# Patient Record
Sex: MALE | Race: WHITE | NOT HISPANIC OR LATINO | URBAN - METROPOLITAN AREA
[De-identification: names, ages, dates, MRNs, and addresses within clinical notes are randomized per-mention and may not be internally consistent; named-entity substitution may affect disease eponyms.]

---

## 2022-04-19 ENCOUNTER — HOSPITAL ENCOUNTER (EMERGENCY)
Facility: HOSPITAL | Age: 67
Discharge: HOME | End: 2022-04-19
Attending: EMERGENCY MEDICINE
Payer: MEDICARE

## 2022-04-19 VITALS
WEIGHT: 200 LBS | DIASTOLIC BLOOD PRESSURE: 82 MMHG | HEIGHT: 70 IN | BODY MASS INDEX: 28.63 KG/M2 | SYSTOLIC BLOOD PRESSURE: 159 MMHG | TEMPERATURE: 98 F | RESPIRATION RATE: 19 BRPM | HEART RATE: 65 BPM | OXYGEN SATURATION: 99 %

## 2022-04-19 DIAGNOSIS — R07.9 CHEST PAIN IN ADULT: ICD-10-CM

## 2022-04-19 DIAGNOSIS — J45.909 ASTHMA, UNSPECIFIED ASTHMA SEVERITY, UNSPECIFIED WHETHER COMPLICATED, UNSPECIFIED WHETHER PERSISTENT: Primary | ICD-10-CM

## 2022-04-19 PROCEDURE — 99284 EMERGENCY DEPT VISIT MOD MDM: CPT

## 2022-04-19 PROCEDURE — 93010 ELECTROCARDIOGRAM REPORT: CPT | Mod: ,,, | Performed by: INTERNAL MEDICINE

## 2022-04-19 PROCEDURE — 93010 EKG 12-LEAD: ICD-10-PCS | Mod: ,,, | Performed by: INTERNAL MEDICINE

## 2022-04-19 PROCEDURE — 93005 ELECTROCARDIOGRAM TRACING: CPT

## 2022-04-19 RX ORDER — ALBUTEROL SULFATE 2.5 MG/.5ML
2.5 SOLUTION RESPIRATORY (INHALATION) EVERY 4 HOURS PRN
Qty: 20 EACH | Refills: 0 | Status: SHIPPED | OUTPATIENT
Start: 2022-04-19 | End: 2023-04-19

## 2022-04-19 RX ORDER — ALBUTEROL SULFATE 1.25 MG/3ML
1.25 SOLUTION RESPIRATORY (INHALATION) EVERY 6 HOURS PRN
COMMUNITY

## 2022-04-19 RX ORDER — ALBUTEROL SULFATE 90 UG/1
1-2 AEROSOL, METERED RESPIRATORY (INHALATION) EVERY 6 HOURS PRN
Qty: 8 G | Refills: 0 | Status: SHIPPED | OUTPATIENT
Start: 2022-04-19

## 2022-04-19 NOTE — ED PROVIDER NOTES
"Encounter Date: 4/19/2022    SCRIBE #1 NOTE: I, Donnakenya Martinez-Hoa and am scribing for, and in the presence of, Aida Wills MD.       History     Chief Complaint   Patient presents with    Shortness of Breath     SOB approx 30 min was incarcerated and was unable to get to albuterol inhaler today, began w SOB, sats are 99% on albuterol mask. Denies pain.     66 year old male with a PMHx of asthma presents to the ED via EMS with wheezing and shortness of breath that began after a police altercation prior to arrival. The patient reports he got pulled over [place] [facility] and the police found warrants for his arrest after running his license. He consequently felt overwhelmed, upset, and felt his "asthma act up." He states he felt relief from the Albuterol treatment given to him by EMS and his wheezing and shortness of breath has since resolved. The patient has an inhaler and breathing treatment machine at home but is out of medication. He denies any history of hospitalization due to his asthma. The patient denies any fever, chest pain, or any other associated symptoms. He has no known allergies.     The history is provided by the patient. No  was used.     Review of patient's allergies indicates:  No Known Allergies  Past Medical History:   Diagnosis Date    Asthma     COPD (chronic obstructive pulmonary disease)      History reviewed. No pertinent surgical history.  History reviewed. No pertinent family history.  Social History     Tobacco Use    Smoking status: Current Every Day Smoker    Smokeless tobacco: Never Used     Review of Systems   Constitutional: Negative for chills and fever.   HENT: Negative for congestion and sore throat.    Eyes: Negative for visual disturbance.   Respiratory: Positive for shortness of breath (resolved) and wheezing (resolved). Negative for cough.    Cardiovascular: Negative for chest pain.   Gastrointestinal: Negative for " abdominal pain, nausea and vomiting.   Genitourinary: Negative for dysuria.   Skin: Negative for rash.   Neurological: Negative for headaches.   Psychiatric/Behavioral: Negative for confusion.        Positive for feeling upset and overwhelmed.       Physical Exam     Initial Vitals [04/19/22 1501]   BP Pulse Resp Temp SpO2   (!) 159/82 65 19 97.9 °F (36.6 °C) 99 %      MAP       --         Physical Exam    Nursing note and vitals reviewed.  Constitutional: He appears well-developed and well-nourished. He is not diaphoretic. No distress.   HENT:   Head: Normocephalic and atraumatic.   Mouth/Throat: Oropharynx is clear and moist.   Eyes: EOM are normal. Pupils are equal, round, and reactive to light.   Neck: Neck supple.   Cardiovascular: Normal rate and regular rhythm.   Pulmonary/Chest: Breath sounds normal. No respiratory distress.   Abdominal: Abdomen is soft. Bowel sounds are normal.   Musculoskeletal:         General: No edema.      Cervical back: Neck supple.     Neurological: He is alert and oriented to person, place, and time.   Skin: Skin is warm and dry.   Psychiatric: He has a normal mood and affect.         ED Course   Procedures  Labs Reviewed - No data to display     ECG Results          EKG 12-lead (Final result)  Result time 04/19/22 18:41:33    Final result by Interface, Lab In [facility] (04/19/22 18:41:33)                 Narrative:    Test Reason : R07.9,    Vent. Rate : 061 BPM     Atrial Rate : 061 BPM     P-R Int : 180 ms          QRS Dur : 100 ms      QT Int : 420 ms       P-R-T Axes : 083 077 082 degrees     QTc Int : 422 ms    Normal sinus rhythm  Normal ECG  No previous ECGs available  Confirmed by Cezar Perlata MD ([id number]) on 4/19/2022 6:41:21 PM    Referred By: [email or web address]   SELF           Confirmed By:Cezar Peralta MD                  ED Interpretation by Aida Wills MD (04/19/22 15:46:55, [facility] - Emergency Dept, Emergency Medicine)    Normal sinus rhythm, rate 61 beats per minute,  normal P interval,  milliseconds.                            Imaging Results    None          Medications - No data to display  Medical Decision Making:   Initial Assessment:   66-year-old male with history of asthma presents after asthma attack.  Patient was pulled over by the police, he states during that encounter, he was advised that he had warrants out for his arrest.  He started to have wheezing and felt like he needed a breathing treatment.  Upon arrival to the ER, the patient has received a new DuoNeb from EMS.  He reports feeling back to his baseline and does not wish to stay for further evaluation.  He does request refill of his albuterol inhaler and albuterol nebulizer.  Patient does not currently feel short of breath.  He denies having any sort of chest pain.  He was in the usual state of health prior to being pulled over by the police.          Scribe Attestation:   Scribe #1: I performed the above scribed service and the documentation accurately describes the services I performed. I attest to the accuracy of the note.                 Clinical Impression:   Final diagnoses:  [J45.909] Asthma, unspecified asthma severity, unspecified whether complicated, unspecified whether persistent (Primary)  [R07.9] Chest pain in adult         I, Aida Wills MD, personally performed the services described in this documentation. All medical record entries made by the scribe were at my direction and in my presence. I have reviewed the chart and agree that the record reflects my personal performance and is accurate and complete.    This dictation has been generated using M-Modal Fluency Direct dictation; some phonetic errors may occur.        ED Disposition Condition    Discharge Stable        ED Prescriptions     Medication Sig Dispense Start Date End Date Auth. Provider    albuterol sulfate 2.5 mg/0.5 mL Nebu Take 2.5 mg by nebulization every 4 (four) hours as needed (wheezing). Rescue 20 each 4/19/2022  4/19/2023 Aida Wills MD    albuterol (PROVENTIL/VENTOLIN HFA) 90 mcg/actuation inhaler Inhale 1-2 puffs into the lungs every 6 (six) hours as needed for Wheezing. Rescue 8 g 4/19/2022  Aida Wills MD        Follow-up Information     Follow up With Specialties Details Why Contact Info    [facility]  Schedule an appointment as soon as possible for a visit  or follow up with your primary care doctor in [place] [address] [place] [address]  [phone]      [facility] Emergency Medicine  As needed, If symptoms worsen [address] [facility] [address] [phone]           Aida Wills MD  04/19/22 2129